# Patient Record
Sex: FEMALE | Race: WHITE | NOT HISPANIC OR LATINO | URBAN - NONMETROPOLITAN AREA
[De-identification: names, ages, dates, MRNs, and addresses within clinical notes are randomized per-mention and may not be internally consistent; named-entity substitution may affect disease eponyms.]

---

## 2022-02-28 ENCOUNTER — OFFICE (OUTPATIENT)
Dept: URBAN - NONMETROPOLITAN AREA CLINIC 5 | Facility: CLINIC | Age: 37
End: 2022-02-28

## 2022-02-28 VITALS
HEART RATE: 73 BPM | SYSTOLIC BLOOD PRESSURE: 140 MMHG | HEIGHT: 64 IN | DIASTOLIC BLOOD PRESSURE: 81 MMHG | RESPIRATION RATE: 20 BRPM | WEIGHT: 288 LBS

## 2022-02-28 DIAGNOSIS — K21.9 GASTRO-ESOPHAGEAL REFLUX DISEASE WITHOUT ESOPHAGITIS: ICD-10-CM

## 2022-02-28 DIAGNOSIS — K59.09 OTHER CONSTIPATION: ICD-10-CM

## 2022-02-28 DIAGNOSIS — R14.0 ABDOMINAL DISTENSION (GASEOUS): ICD-10-CM

## 2022-02-28 PROCEDURE — 99204 OFFICE O/P NEW MOD 45 MIN: CPT | Performed by: INTERNAL MEDICINE

## 2022-02-28 RX ORDER — OMEPRAZOLE 40 MG/1
CAPSULE, DELAYED RELEASE ORAL
Qty: 30 | Refills: 11 | Status: ACTIVE
Start: 2022-02-28

## 2022-02-28 NOTE — SERVICEHPINOTES
Stephanie Bang   is a   35 yo  female  with a past medical history of sleep apnea, obesity, and prior cholecystectomy who presents to establish care for constipation, abdominal pain, bloating, and reflux.  Patient reports she has had stomach problems all her life.  Recently she has developed a pain in the left side of her abdomen.  She also notes when she eats she can experience bloating and the top of her abdomen will "bulge" out.  She also previously was experiencing episodes of vomiting in her sleep.  This was happening frequently but has now improved.  She also reports episodes monthly of "sulfur burps", nausea, vomiting, and feeling like her insides are on fire.  She does report borderline diabetes with A1c of 5.9%.  She was recently started on Linzess and felt that this improved all her symptoms overall.  She unfortunately was getting diarrhea and therefore stopped it.  She reports having a colonoscopy in [place] in 2020 and was told that she had an anal fissure but otherwise everything was unremarkable.  She denies any smoking, EtOH use, illicit drug use, or NSAID use.  She does endorse bloating and early satiety.  She denies any nausea, vomiting, bright blood per rectum, melena, dysphagia, or odynophagia.  She has never attempted Citrucel for regularity of her bowels.  She has never been on a PPI daily.  She has never attempted Trulance in the past.

## 2022-02-28 NOTE — SERVICENOTES
Given patient's constipation I have counseled her on attempting Citrucel.  If after several weeks she does not see any improvement she can attempt truelance and samples were provided today.  If she thinks the Linzess was working better she can restart the Linzess daily but will need to stay on it until her bowels regulate.  In regards patient's reflux symptoms will start on omeprazole 40 mg daily 30-45 minutes prior to breakfast.  Also counseled her on conservative measures with avoiding reflux as below.  For her bloating and episodes of "sulfur" burps do suspect possible component of gastroparesis in the setting of her borderline diabetes.  Counseled her on tight diabetic control and attempting a gastroparesis diet.  Counseled extensively on the need for weight loss.  Will plan to see back in 2 months to assess symptoms.